# Patient Record
Sex: MALE | Race: WHITE | Employment: OTHER | URBAN - METROPOLITAN AREA
[De-identification: names, ages, dates, MRNs, and addresses within clinical notes are randomized per-mention and may not be internally consistent; named-entity substitution may affect disease eponyms.]

---

## 2017-06-20 ENCOUNTER — APPOINTMENT (OUTPATIENT)
Dept: GENERAL RADIOLOGY | Age: 72
End: 2017-06-20
Attending: INTERNAL MEDICINE
Payer: MEDICARE

## 2017-06-20 ENCOUNTER — HOSPITAL ENCOUNTER (OUTPATIENT)
Age: 72
Setting detail: OUTPATIENT SURGERY
Discharge: HOME | End: 2017-06-20
Attending: INTERNAL MEDICINE | Admitting: INTERNAL MEDICINE
Payer: MEDICARE

## 2017-06-20 ENCOUNTER — ANESTHESIA EVENT (OUTPATIENT)
Dept: ENDOSCOPY | Age: 72
End: 2017-06-20
Payer: MEDICARE

## 2017-06-20 ENCOUNTER — ANESTHESIA (OUTPATIENT)
Dept: ENDOSCOPY | Age: 72
End: 2017-06-20
Payer: MEDICARE

## 2017-06-20 VITALS
BODY MASS INDEX: 19.48 KG/M2 | HEIGHT: 76 IN | WEIGHT: 160 LBS | OXYGEN SATURATION: 98 % | RESPIRATION RATE: 18 BRPM | SYSTOLIC BLOOD PRESSURE: 165 MMHG | TEMPERATURE: 98 F | DIASTOLIC BLOOD PRESSURE: 81 MMHG | HEART RATE: 58 BPM

## 2017-06-20 PROCEDURE — 77030012595 HC SPHNTOM BILI BSC -D: Performed by: INTERNAL MEDICINE

## 2017-06-20 PROCEDURE — 77030036655 HC CATH ENDOSC ACC DEL SPYSCP BSC -H1: Performed by: INTERNAL MEDICINE

## 2017-06-20 PROCEDURE — 76040000029: Performed by: INTERNAL MEDICINE

## 2017-06-20 PROCEDURE — 77030032490 HC SLV COMPR SCD KNE COVD -B: Performed by: INTERNAL MEDICINE

## 2017-06-20 PROCEDURE — 76060000035 HC ANESTHESIA 2 TO 2.5 HR: Performed by: INTERNAL MEDICINE

## 2017-06-20 PROCEDURE — 74011250636 HC RX REV CODE- 250/636: Performed by: INTERNAL MEDICINE

## 2017-06-20 PROCEDURE — C1769 GUIDE WIRE: HCPCS | Performed by: INTERNAL MEDICINE

## 2017-06-20 PROCEDURE — C1726 CATH, BAL DIL, NON-VASCULAR: HCPCS | Performed by: INTERNAL MEDICINE

## 2017-06-20 PROCEDURE — 77030007288 HC DEV LOK BILI BSC -A: Performed by: INTERNAL MEDICINE

## 2017-06-20 PROCEDURE — 74011250636 HC RX REV CODE- 250/636: Performed by: ANESTHESIOLOGY

## 2017-06-20 PROCEDURE — 88112 CYTOPATH CELL ENHANCE TECH: CPT | Performed by: INTERNAL MEDICINE

## 2017-06-20 PROCEDURE — 74011250636 HC RX REV CODE- 250/636

## 2017-06-20 PROCEDURE — 77030011640 HC PAD GRND REM COVD -A: Performed by: INTERNAL MEDICINE

## 2017-06-20 PROCEDURE — 77030009038 HC CATH BILI STN RTVR BSC -C: Performed by: INTERNAL MEDICINE

## 2017-06-20 PROCEDURE — 77030008703 HC TU ET UNCUF COVD -A: Performed by: ANESTHESIOLOGY

## 2017-06-20 PROCEDURE — 74011000250 HC RX REV CODE- 250

## 2017-06-20 PROCEDURE — 74330 X-RAY BILE/PANC ENDOSCOPY: CPT

## 2017-06-20 PROCEDURE — C2625 STENT, NON-COR, TEM W/DEL SY: HCPCS | Performed by: INTERNAL MEDICINE

## 2017-06-20 PROCEDURE — 77030036933 HC BRSH RX CYTO WREGD BSC -C: Performed by: INTERNAL MEDICINE

## 2017-06-20 DEVICE — BILIARY STENT WITH RX DELIVERY SYSTEM
Type: IMPLANTABLE DEVICE | Site: BILE DUCT | Status: FUNCTIONAL
Brand: RX BILIARY

## 2017-06-20 RX ORDER — SODIUM CHLORIDE, SODIUM LACTATE, POTASSIUM CHLORIDE, CALCIUM CHLORIDE 600; 310; 30; 20 MG/100ML; MG/100ML; MG/100ML; MG/100ML
1000 INJECTION, SOLUTION INTRAVENOUS CONTINUOUS
Status: DISCONTINUED | OUTPATIENT
Start: 2017-06-20 | End: 2017-06-20 | Stop reason: HOSPADM

## 2017-06-20 RX ORDER — DEXAMETHASONE SODIUM PHOSPHATE 4 MG/ML
INJECTION, SOLUTION INTRA-ARTICULAR; INTRALESIONAL; INTRAMUSCULAR; INTRAVENOUS; SOFT TISSUE AS NEEDED
Status: DISCONTINUED | OUTPATIENT
Start: 2017-06-20 | End: 2017-06-20 | Stop reason: HOSPADM

## 2017-06-20 RX ORDER — ROCURONIUM BROMIDE 10 MG/ML
INJECTION, SOLUTION INTRAVENOUS AS NEEDED
Status: DISCONTINUED | OUTPATIENT
Start: 2017-06-20 | End: 2017-06-20 | Stop reason: HOSPADM

## 2017-06-20 RX ORDER — SUCCINYLCHOLINE CHLORIDE 20 MG/ML
INJECTION INTRAMUSCULAR; INTRAVENOUS AS NEEDED
Status: DISCONTINUED | OUTPATIENT
Start: 2017-06-20 | End: 2017-06-20 | Stop reason: HOSPADM

## 2017-06-20 RX ORDER — SODIUM CHLORIDE 0.9 % (FLUSH) 0.9 %
5-10 SYRINGE (ML) INJECTION AS NEEDED
Status: DISCONTINUED | OUTPATIENT
Start: 2017-06-20 | End: 2017-06-20 | Stop reason: HOSPADM

## 2017-06-20 RX ORDER — SODIUM CHLORIDE, SODIUM LACTATE, POTASSIUM CHLORIDE, CALCIUM CHLORIDE 600; 310; 30; 20 MG/100ML; MG/100ML; MG/100ML; MG/100ML
100 INJECTION, SOLUTION INTRAVENOUS CONTINUOUS
Status: DISCONTINUED | OUTPATIENT
Start: 2017-06-20 | End: 2017-06-20 | Stop reason: HOSPADM

## 2017-06-20 RX ORDER — CIPROFLOXACIN 2 MG/ML
400 INJECTION, SOLUTION INTRAVENOUS
Status: COMPLETED | OUTPATIENT
Start: 2017-06-20 | End: 2017-06-20

## 2017-06-20 RX ORDER — FAMOTIDINE 20 MG/1
20 TABLET, FILM COATED ORAL AS NEEDED
Status: DISCONTINUED | OUTPATIENT
Start: 2017-06-20 | End: 2017-06-20 | Stop reason: HOSPADM

## 2017-06-20 RX ORDER — LIDOCAINE HYDROCHLORIDE 20 MG/ML
INJECTION, SOLUTION EPIDURAL; INFILTRATION; INTRACAUDAL; PERINEURAL AS NEEDED
Status: DISCONTINUED | OUTPATIENT
Start: 2017-06-20 | End: 2017-06-20 | Stop reason: HOSPADM

## 2017-06-20 RX ORDER — FAMOTIDINE 10 MG/ML
INJECTION INTRAVENOUS
Status: COMPLETED
Start: 2017-06-20 | End: 2017-06-20

## 2017-06-20 RX ORDER — ONDANSETRON 2 MG/ML
INJECTION INTRAMUSCULAR; INTRAVENOUS AS NEEDED
Status: DISCONTINUED | OUTPATIENT
Start: 2017-06-20 | End: 2017-06-20 | Stop reason: HOSPADM

## 2017-06-20 RX ORDER — GLUCOSAMINE HCL 500 MG
TABLET ORAL
COMMUNITY

## 2017-06-20 RX ORDER — FENTANYL CITRATE 50 UG/ML
INJECTION, SOLUTION INTRAMUSCULAR; INTRAVENOUS AS NEEDED
Status: DISCONTINUED | OUTPATIENT
Start: 2017-06-20 | End: 2017-06-20 | Stop reason: HOSPADM

## 2017-06-20 RX ORDER — PROPOFOL 10 MG/ML
INJECTION, EMULSION INTRAVENOUS AS NEEDED
Status: DISCONTINUED | OUTPATIENT
Start: 2017-06-20 | End: 2017-06-20 | Stop reason: HOSPADM

## 2017-06-20 RX ADMIN — SODIUM CHLORIDE, SODIUM LACTATE, POTASSIUM CHLORIDE, AND CALCIUM CHLORIDE: 600; 310; 30; 20 INJECTION, SOLUTION INTRAVENOUS at 11:48

## 2017-06-20 RX ADMIN — DEXAMETHASONE SODIUM PHOSPHATE 4 MG: 4 INJECTION, SOLUTION INTRA-ARTICULAR; INTRALESIONAL; INTRAMUSCULAR; INTRAVENOUS; SOFT TISSUE at 11:06

## 2017-06-20 RX ADMIN — CIPROFLOXACIN 400 MG: 2 INJECTION, SOLUTION INTRAVENOUS at 11:54

## 2017-06-20 RX ADMIN — PROPOFOL 30 MG: 10 INJECTION, EMULSION INTRAVENOUS at 11:45

## 2017-06-20 RX ADMIN — LIDOCAINE HYDROCHLORIDE 60 MG: 20 INJECTION, SOLUTION EPIDURAL; INFILTRATION; INTRACAUDAL; PERINEURAL at 11:00

## 2017-06-20 RX ADMIN — FENTANYL CITRATE 50 MCG: 50 INJECTION, SOLUTION INTRAMUSCULAR; INTRAVENOUS at 11:00

## 2017-06-20 RX ADMIN — FENTANYL CITRATE 25 MCG: 50 INJECTION, SOLUTION INTRAMUSCULAR; INTRAVENOUS at 12:15

## 2017-06-20 RX ADMIN — ONDANSETRON 4 MG: 2 INJECTION INTRAMUSCULAR; INTRAVENOUS at 12:08

## 2017-06-20 RX ADMIN — ROCURONIUM BROMIDE 5 MG: 10 INJECTION, SOLUTION INTRAVENOUS at 11:00

## 2017-06-20 RX ADMIN — FAMOTIDINE: 10 INJECTION, SOLUTION INTRAVENOUS at 10:40

## 2017-06-20 RX ADMIN — SODIUM CHLORIDE, SODIUM LACTATE, POTASSIUM CHLORIDE, AND CALCIUM CHLORIDE 1000 ML: 600; 310; 30; 20 INJECTION, SOLUTION INTRAVENOUS at 10:33

## 2017-06-20 RX ADMIN — SUCCINYLCHOLINE CHLORIDE 140 MG: 20 INJECTION INTRAMUSCULAR; INTRAVENOUS at 11:00

## 2017-06-20 RX ADMIN — LIDOCAINE HYDROCHLORIDE 40 MG: 20 INJECTION, SOLUTION EPIDURAL; INFILTRATION; INTRACAUDAL; PERINEURAL at 11:03

## 2017-06-20 RX ADMIN — FENTANYL CITRATE 25 MCG: 50 INJECTION, SOLUTION INTRAMUSCULAR; INTRAVENOUS at 11:45

## 2017-06-20 RX ADMIN — PROPOFOL 170 MG: 10 INJECTION, EMULSION INTRAVENOUS at 11:00

## 2017-06-20 NOTE — H&P
>      Patient:  Kiya Cabral  YOB: 1945   Date:                       06/19/2017 4:43 PM   Visit Type:                 Office Visit        This 66year old male presents for RUQ Pain, Jaundice and CBD dilatation. History of Present Illness:  1.  RUQ Pain   2. Jaundice   3. CBD dilatation   Work-In MD: Dr. Jonh Garcia    This is a 18-year-old male who is seen today for right upper quadrant pain, dilated common bile duct, elevated LFTs, and jaundice. He had a complete physical done in May 2017 with labs which revealed normal LFTs at that time. He was treated with Bactrim starting 5/25/17 for 10 days for an infected fingernail. Some time after that treatment with Bactrim he started to become jaundiced. He also started experiencing some right upper quadrant pain. Denies any nausea/vomiting. He also had some new onset constipation. He took some Dulocolax and MOM for the constipation. He presented to his PCP 6/15/17 for complaints of constipation and was noted to be jaundiced. RUQ u/s and labs were performed. TB is 23, , , , Viral hepatitis panel is negative. U/S revealed a CBD 14mm along with intrahepatic ductal dilatation. Sludge was noted in the gallbladder. Pancrease appeared normal on U/S. He reports having mild RUQ pain wich is intermittent. Denies any fever/chills. ROS: A 10 point review of systems was completed and is positive per HPI otherwise negative      PAST MEDICAL/SURGICAL HISTORY   (Detailed)    Disease/disorder Onset Date Management Date Comments   migraines, crushed tibial plateu skiing, rotator cuff tears both sides repaired, colon polyp at age 61 in [place], Squamous cell CA removed L temple, Left collar bone fracture after a fall, rhinoplasty 1/2015.      Cardiac stress test 2012: good    TNM 06/19/2017 -   Additional Medical History  Denies any major medical history          Family History  (Detailed)      Social History: (Detailed)  Preferred language is [place]. MARITAL STATUS/FAMILY/SOCIAL SUPPORT  Currently unknown. PHYSICAL EXAM  GENERAL: JAUNDICED, no acute distress  HEENT: Head is normocephalic, atraumatic, sclerae Icterus. PULMONARY: Comfortable breathing with no accessory muscle use, CTA bilaterally, no wheezes/rhonchi   CARDIOVASCULAR: RRR, no murmurs, gallops. or rubs appreciated  ABDOMEN: soft, Nondistended, TTP in RUQ, normoactive bowel sounds, no palpable masses appreciated, no obvious hepatomegaly or splenomegaly  RECTAL: deferred  EXTREMITY: no edema or cyanosis noted  SKIN: warm, dry, intact, no rashes noted. NEUROLOGICAL: Alert and oriented, gross memory appears intact  PSYCHIATRIC: cooperative, mood and affect appropriate    Assessment/Plan  # Detail Type Description    1. Assessment Jaundice (R17). 2. Assessment elevated LFTs (R79.89). 3. Assessment Right upper quadrant pain (R10.11). Provider Plan This is a 67 yo male who is seen today for complaints of Jaundice, RUQ Pain, dilated CBD on U/S, Elevated LFT. He did finish 10 day course of Bactrim which started May 25 so DILI is in the differential. However, with elevated LFT, jaundice, RUQ Pain and findings on U/S will plan for ERCP to evaluate for possible choledocholithiasis, extrinsic compression, biliary sludge, etc. He will call with any increasing pain, fever, or chills. ASA PS III   Risk of procedure is discussed with patient to include risk of infection, bleeding, perforation, sedation reaction, Pancreatitis, and death. He voiced understanding and wishes to plan for procedure. This patient was seen in collaboration with DR. Asuncion Espino who agrees with this assessment and plan. Elements of this note may have been dictated using speech recognition software. As a result, errors of speech recognition may have occurred.       Provider:      Duc Diaz 06/19/2017 5:54 PM   Document generated by: Silvia Jacob 06/19/2017    Electronically signed by Silvia Jacob NP on 06/19/2017 05:54 PM

## 2017-06-20 NOTE — DISCHARGE INSTRUCTIONS
Endoscopic Retrograde Cholangiopancreatogram (ERCP): What to Expect at [address]  After you have an endoscopic retrograde cholangiopancreatogram (ERCP), you probably will stay at the hospital or clinic for 1 to 2 hours. This will allow the medicine to wear off. You will be able to go home after your doctor or a nurse checks to make sure you are not having any problems. If you stay in the hospital overnight, you may go home the next day. You may have a sore throat for a day or two after the procedure. This care sheet gives you a general idea about how long it will take for you to recover. But each person recovers at a different pace. Follow the steps below to get better as quickly as possible. How can you care for yourself at home? Activity  · Rest as much as you need to after you go home. · You should be able to go back to your usual activities the day after the procedure. Diet  · Follow your doctor's directions for eating after the procedure. · Drink plenty of fluids (unless your doctor tells you not to). Medicines  · Your doctor will tell you if and when you can restart your medicines. He or she will also give you instructions about taking any new medicines. · If you take blood thinners, such as warfarin (Coumadin), clopidogrel (Plavix), or aspirin, be sure to talk to your doctor. He or she will tell you if and when to start taking those medicines again. Make sure that you understand exactly what your doctor wants you to do. · If you have a sore throat the next day, use an over-the-counter spray to numb your throat. Be safe with medicines. Read and follow all instructions on the label. Follow-up care is a key part of your treatment and safety. Be sure to make and go to all appointments, and call your doctor if you are having problems. It's also a good idea to know your test results and keep a list of the medicines you take. When should you call for help?   Call 911 anytime you think you may need emergency care. For example, call if:  · You vomit blood or what looks like coffee grounds. · You pass maroon or bloody stools. Call your doctor now or go to the emergency room if:  · You have trouble swallowing. · You have belly pain. · Your stools are black and tarlike or have streaks of blood. · You are sick to your stomach and cannot drink fluids. · You have a fever. · You have pain that does not get better after you take your pain medicine. Watch closely for changes in your health, and be sure to contact your doctor if:  · Your throat still hurts after a day or two. · You do not get better as expected. Where can you learn more? Go to http://abdiel-ezekiel.info/. Enter C286 in the search box to learn more about \"Endoscopic Retrograde Cholangiopancreatogram (ERCP): What to Expect at Home. \"  Current as of: August 9, 2016  Content Version: 11.3  © 6100-7156 [employer]. Care instructions adapted under license by [employer] (which disclaims liability or warranty for this information). If you have questions about a medical condition or this instruction, always ask your healthcare professional. [address] any warranty or liability for your use of this information. After general anesthesia or intravenous sedation, for 24 hours or while taking prescription Narcotics:  · Limit your activities  · Do not drive and operate hazardous machinery  · Do not make important personal or business decisions  · Do  not drink alcoholic beverages  · If you have not urinated within 8 hours after discharge, please contact your surgeon on call. *  Please give a list of your current medications to your Primary Care Provider. *  Please update this list whenever your medications are discontinued, doses are      changed, or new medications (including over-the-counter products) are added.   *  Please carry medication information at all times in case of emergency situations. These are general instructions for a healthy lifestyle:  No smoking/ No tobacco products/ Avoid exposure to second hand smoke  Surgeon General's Warning:  Quitting smoking now greatly reduces serious risk to your health. Obesity, smoking, and sedentary lifestyle greatly increases your risk for illness  A healthy diet, regular physical exercise & weight monitoring are important for maintaining a healthy lifestyle    You may be retaining fluid if you have a history of heart failure or if you experience any of the following symptoms:  Weight gain of 3 pounds or more overnight or 5 pounds in a week, increased swelling in our hands or feet or shortness of breath while lying flat in bed. Please call your doctor as soon as you notice any of these symptoms; do not wait until your next office visit. Recognize signs and symptoms of STROKE:    F-face looks uneven    A-arms unable to move or move unevenly    S-speech slurred or non-existent    T-time-call 911 as soon as signs and symptoms begin-DO NOT go       Back to bed or wait to see if you get better-TIME IS BRAIN.

## 2017-06-20 NOTE — PROCEDURES
ENDOSCOPIC  RETROGRADE CHOLANGIOPANCREATOGRAPHY    DATE of PROCEDURE: 6/20/2017    PT NAME: Funmilayo Obando     [id number]    MEDICATION: general;     INSTRUMENT:  [id number] VF    SPECIAL PROCEDURE:papillotomy w/ balloon sweep; spyglass, 40 x 10 hurricane balloon; dual 8.5 x 12 cm biliary stents; cytology  BLOOD LOSS- 0 to min. SPEC-   IMPLANT- none    PROCEDURE: standard procedure w/o complications    ASSESSMENT:  1. Tight stricture just above cystic duct takeoff suspicious for malignancy- initially contrast or wire would not pass. Using spyglass we passed a guide wire. Using an rx 44 we were able to \"saw\" an opening enough for hurricane balloon. 10 mm hurricane balloon was able to obliterate the tight waist. Cytology was obtained. 2 biliary stents were placed but neither followed the guide wire into the left system. Both well above the stricture.    2. Pancreas not evaluated    3. GB normal    PLAN:  1. F/u per Dr. Brendan Worthy MD

## 2017-06-20 NOTE — ANESTHESIA PREPROCEDURE EVALUATION
Anesthetic History   No history of anesthetic complications            Review of Systems / Medical History  Patient summary reviewed, nursing notes reviewed and pertinent labs reviewed    Pulmonary  Within defined limits                 Neuro/Psych   Within defined limits           Cardiovascular  Within defined limits                Exercise tolerance: >4 METS     GI/Hepatic/Renal     GERD: well controlled          Comments: Patient has obstructive jaundice Endo/Other  Within defined limits           Other Findings              Physical Exam    Airway  Mallampati: II  TM Distance: 4 - 6 cm  Neck ROM: normal range of motion   Mouth opening: Normal     Cardiovascular  Regular rate and rhythm,  S1 and S2 normal,  no murmur, click, rub, or gallop  Rhythm: regular  Rate: normal         Dental    Dentition: Caps/crowns     Pulmonary  Breath sounds clear to auscultation               Abdominal         Other Findings            Anesthetic Plan    ASA: 2  Anesthesia type: general          Induction: Intravenous  Anesthetic plan and risks discussed with: Patient

## 2017-06-20 NOTE — IP AVS SNAPSHOT
[address] [address] 
[phone] Patient: Sheryle Grant MRN: [id number] OLF:5/61/5779 You are allergic to the following No active allergies Recent Documentation Height Weight BMI Smoking Status 1.93 m 72.6 kg 19.48 kg/m2 Never Smoker Emergency Contacts Name Discharge Info Relation Home Work Mobile Amilcar Sommer  Friend [5] [phone] About your hospitalization You were admitted on:  June 20, 2017 You last received care in the:  [facility] PACU You were discharged on:  June 20, 2017 Unit phone number:  [phone] Why you were hospitalized Your primary diagnosis was:  Not on File Providers Seen During Your Hospitalizations Provider Role Specialty Primary office phone Chantel Patel MD Attending Provider Gastroenterology [phone] Your Primary Care Physician (PCP) Primary Care Physician Office Phone Office Fax [place], [address] [place] [address] [phone] Follow-up Information Follow up With Details Comments Contact Info Chantel Patel MD  Follow up as Dr. Consuelo Franks directs [address] [facility] [address] [facility] [address] 
[phone] Current Discharge Medication List  
  
ASK your doctor about these medications Dose & Instructions Dispensing Information Comments Morning Noon Evening Bedtime Cholecalciferol (Vitamin D3) 3,000 unit Tab Your last dose was: Your next dose is: Take  by mouth. Refills:  0 DIGESTIVE ENZYMES PO Your last dose was: Your next dose is: Take  by mouth. Refills:  0 OMEGA 3 PO Your last dose was: Your next dose is: Take  by mouth. Refills:  0 PROBIOTIC 4X 10-15 mg Tbec Generic drug:  B.infantis-B.ani-B.long-B.bifi Your last dose was: Your next dose is: Take  by mouth. Refills:  0  
     
   
   
   
  
 VITAMIN B12 PO Your last dose was: Your next dose is: Take  by mouth. Refills:  0 Discharge Instructions Endoscopic Retrograde Cholangiopancreatogram (ERCP): What to Expect at [facility] Your Recovery After you have an endoscopic retrograde cholangiopancreatogram (ERCP), you probably will stay at the hospital or clinic for 1 to 2 hours. This will allow the medicine to wear off. You will be able to go home after your doctor or a nurse checks to make sure you are not having any problems. If you stay in the hospital overnight, you may go home the next day. You may have a sore throat for a day or two after the procedure. This care sheet gives you a general idea about how long it will take for you to recover. But each person recovers at a different pace. Follow the steps below to get better as quickly as possible. How can you care for yourself at home? Activity · Rest as much as you need to after you go home. · You should be able to go back to your usual activities the day after the procedure. Diet · Follow your doctor's directions for eating after the procedure. · Drink plenty of fluids (unless your doctor tells you not to). Medicines · Your doctor will tell you if and when you can restart your medicines. He or she will also give you instructions about taking any new medicines. · If you take blood thinners, such as warfarin (Coumadin), clopidogrel (Plavix), or aspirin, be sure to talk to your doctor. He or she will tell you if and when to start taking those medicines again. Make sure that you understand exactly what your doctor wants you to do.  
· If you have a sore throat the next day, use an over-the-counter spray to numb your throat. Be safe with medicines. Read and follow all instructions on the label. Follow-up care is a key part of your treatment and safety. Be sure to make and go to all appointments, and call your doctor if you are having problems. It's also a good idea to know your test results and keep a list of the medicines you take. When should you call for help? Call 911 anytime you think you may need emergency care. For example, call if: 
· You vomit blood or what looks like coffee grounds. · You pass maroon or bloody stools. Call your doctor now or go to the emergency room if: 
· You have trouble swallowing. · You have belly pain. · Your stools are black and tarlike or have streaks of blood. · You are sick to your stomach and cannot drink fluids. · You have a fever. · You have pain that does not get better after you take your pain medicine. Watch closely for changes in your health, and be sure to contact your doctor if: 
· Your throat still hurts after a day or two. · You do not get better as expected. Where can you learn more? Go to http://abdiel-ezekiel.info/. Enter W032 in the search box to learn more about \"Endoscopic Retrograde Cholangiopancreatogram (ERCP): What to Expect at Home. \" Current as of: August 9, 2016 Content Version: 11.3 © 5840-0475 [employer]. Care instructions adapted under license by [employer] (which disclaims liability or warranty for this information). If you have questions about a medical condition or this instruction, always ask your healthcare professional. [address] [address] any warranty or liability for your use of this information. After general anesthesia or intravenous sedation, for 24 hours or while taking prescription Narcotics: · Limit your activities · Do not drive and operate hazardous machinery · Do not make important personal or business decisions · Do  not drink alcoholic beverages · If you have not urinated within 8 hours after discharge, please contact your surgeon on call. *  Please give a list of your current medications to your Primary Care Provider. *  Please update this list whenever your medications are discontinued, doses are 
    changed, or new medications (including over-the-counter products) are added. *  Please carry medication information at all times in case of emergency situations. These are general instructions for a healthy lifestyle: No smoking/ No tobacco products/ Avoid exposure to second hand smoke Surgeon General's Warning:  Quitting smoking now greatly reduces serious risk to your health. Obesity, smoking, and sedentary lifestyle greatly increases your risk for illness A healthy diet, regular physical exercise & weight monitoring are important for maintaining a healthy lifestyle You may be retaining fluid if you have a history of heart failure or if you experience any of the following symptoms:  Weight gain of 3 pounds or more overnight or 5 pounds in a week, increased swelling in our hands or feet or shortness of breath while lying flat in bed. Please call your doctor as soon as you notice any of these symptoms; do not wait until your next office visit. Recognize signs and symptoms of STROKE: 
 
F-face looks uneven A-arms unable to move or move unevenly S-speech slurred or non-existent T-time-call 911 as soon as signs and symptoms begin-DO NOT go Back to bed or wait to see if you get better-TIME IS BRAIN. Discharge Orders None Introducing [facility]! Zuri Call introduces [employer] patient portal. Now you can access parts of your medical record, email your doctor's office, and request medication refills online. 1. In your internet browser, go to [email or web address] 2. Click on the First Time User? Click Here link in the Sign In box. You will see the New Member Sign Up page. 3. Enter your Fancloud Access Code exactly as it appears below. You will not need to use this code after youve completed the sign-up process. If you do not sign up before the expiration date, you must request a new code. · Fan[employer] Access Code: [email or web address] Expires: 9/18/2017  9:14 AM 
 
4. Enter the last four digits of your Social Security Number (xxxx) and Date of Birth (mm/dd/yyyy) as indicated and click Submit. You will be taken to the next sign-up page. 5. Create a Fancloud ID. This will be your Fancloud login ID and cannot be changed, so think of one that is secure and easy to remember. 6. Create a Fan[employer] password. You can change your password at any time. 7. Enter your Password Reset Question and Answer. This can be used at a later time if you forget your password. 8. Enter your e-mail address. You will receive e-mail notification when new information is available in [address]. 9. Click Sign Up. You can now view and download portions of your medical record. 10. Click the Download Summary menu link to download a portable copy of your medical information. If you have questions, please visit the Frequently Asked Questions section of the Fan[employer] website. Remember, Fan[employer] is NOT to be used for urgent needs. For medical emergencies, dial 911. Now available from your iPhone and Android! General Information Please provide this summary of care documentation to your next provider. Patient Signature:  ____________________________________________________________ Date:  ____________________________________________________________  
  
[facility] Provider Signature:  ____________________________________________________________ Date:  ____________________________________________________________

## 2017-06-20 NOTE — INTERVAL H&P NOTE
H&P Update:  Shelby Villela was seen and examined. History and physical has been reviewed. The patient has been examined. There have been no significant clinical changes since the completion of the originally dated History and Physical.  Patient identified by surgeon; surgical site was confirmed by patient and surgeon.     Signed By: Terrence Maldonado MD     June 20, 2017 10:51 AM

## 2017-06-20 NOTE — ANESTHESIA POSTPROCEDURE EVALUATION
Post-Anesthesia Evaluation and Assessment    Patient: Devon Mayorga MRN: [id number]  SSN: [id number]    YOB: 1945  Age: 66 y.o. Sex: male       Cardiovascular Function/Vital Signs  Visit Vitals    /83    Pulse (!) 58    Temp 36.1 °C (97 °F)    Resp 25    Ht 6' 4\" (1.93 m)    Wt 72.6 kg (160 lb)    SpO2 99%    BMI 19.48 kg/m2       Patient is status post general anesthesia for Procedure(s):  ENDOSCOPIC RETROGRADE CHOLANGIOPANCREATOGRAPHY  ENDOSCOPIC SPHINCTEROTOMY  ENDOSCOPIC STONE EXTRACTION/BALLOON SWEEP  ENDOSCOPIC RETROGRADE CHOLANGIOPANCREATOGRAPHY DIRECT VISUALIZATION  BILIARY DILATATION  ENDOSCOPIC BRUSHING  BILIARY STENT PLACEMENT. Nausea/Vomiting: None    Postoperative hydration reviewed and adequate. Pain:  Pain Scale 1: Numeric (0 - 10) (06/20/17 1306)  Pain Intensity 1: 0 (06/20/17 1306)   Managed    Neurological Status:   Neuro (WDL): Exceptions to WDL (06/20/17 1306)  Neuro  Neurologic State: Drowsy (06/20/17 1306)  LUE Motor Response: Purposeful (06/20/17 1306)  LLE Motor Response: Purposeful (06/20/17 1306)  RUE Motor Response: Purposeful (06/20/17 1306)  RLE Motor Response: Purposeful (06/20/17 1306)   At baseline    Mental Status and Level of Consciousness: Arousable    Pulmonary Status:   O2 Device: Nasal cannula (06/20/17 1306)   Adequate oxygenation and airway patent    Complications related to anesthesia: None    Post-anesthesia assessment completed.  No concerns    Signed By: Maris Warren MD     June 20, 2017

## 2017-07-03 ENCOUNTER — HOSPITAL ENCOUNTER (OUTPATIENT)
Dept: MRI IMAGING | Age: 72
Discharge: HOME | End: 2017-07-03
Attending: INTERNAL MEDICINE
Payer: MEDICARE

## 2017-07-03 DIAGNOSIS — R79.89 ABNORMAL LFTS: ICD-10-CM

## 2017-07-03 DIAGNOSIS — R17 JAUNDICE: ICD-10-CM

## 2017-07-03 DIAGNOSIS — K83.1 OBSTRUCTION OF BILE DUCT: ICD-10-CM

## 2017-07-03 DIAGNOSIS — K83.8 COMMON BILE DUCT DILATATION: ICD-10-CM

## 2017-07-03 LAB — CREAT BLD-MCNC: 0.9 MG/DL (ref 0.6–1)

## 2017-07-03 PROCEDURE — A9577 INJ MULTIHANCE: HCPCS | Performed by: INTERNAL MEDICINE

## 2017-07-03 PROCEDURE — 74183 MRI ABD W/O CNTR FLWD CNTR: CPT

## 2017-07-03 PROCEDURE — 74011250636 HC RX REV CODE- 250/636: Performed by: INTERNAL MEDICINE

## 2017-07-03 PROCEDURE — 82565 ASSAY OF CREATININE: CPT

## 2017-07-03 PROCEDURE — 74011000258 HC RX REV CODE- 258: Performed by: INTERNAL MEDICINE

## 2017-07-03 RX ORDER — SODIUM CHLORIDE 0.9 % (FLUSH) 0.9 %
10 SYRINGE (ML) INJECTION
Status: COMPLETED | OUTPATIENT
Start: 2017-07-03 | End: 2017-07-03

## 2017-07-03 RX ADMIN — GADOBENATE DIMEGLUMINE 10 ML: 529 INJECTION, SOLUTION INTRAVENOUS at 16:38

## 2017-07-03 RX ADMIN — Medication 10 ML: at 16:38

## 2017-07-03 RX ADMIN — SODIUM CHLORIDE 100 ML: 900 INJECTION, SOLUTION INTRAVENOUS at 16:38

## 2018-10-22 ENCOUNTER — HOSPITAL ENCOUNTER (OUTPATIENT)
Dept: LAB | Age: 73
Discharge: HOME | End: 2018-10-22

## 2018-10-22 LAB
ALBUMIN SERPL-MCNC: 2 G/DL (ref 3.2–4.6)
ALBUMIN/GLOB SERPL: 0.5 {RATIO} (ref 1.2–3.5)
ALP SERPL-CCNC: 442 U/L (ref 50–136)
ALT SERPL-CCNC: 58 U/L (ref 12–65)
ANION GAP SERPL CALC-SCNC: 10 MMOL/L (ref 7–16)
AST SERPL-CCNC: 89 U/L (ref 15–37)
BASOPHILS # BLD: 0.1 K/UL (ref 0–0.2)
BASOPHILS NFR BLD: 1 % (ref 0–2)
BILIRUB DIRECT SERPL-MCNC: 13.2 MG/DL
BILIRUB SERPL-MCNC: 15.4 MG/DL (ref 0.2–1.1)
BUN SERPL-MCNC: 15 MG/DL (ref 8–23)
CALCIUM SERPL-MCNC: 8.6 MG/DL (ref 8.3–10.4)
CHLORIDE SERPL-SCNC: 103 MMOL/L (ref 98–107)
CO2 SERPL-SCNC: 26 MMOL/L (ref 21–32)
CREAT SERPL-MCNC: 0.64 MG/DL (ref 0.8–1.5)
D DIMER PPP FEU-MCNC: 2.11 UG/ML(FEU)
DIFFERENTIAL METHOD BLD: ABNORMAL
EOSINOPHIL # BLD: 0.2 K/UL (ref 0–0.8)
EOSINOPHIL NFR BLD: 3 % (ref 0.5–7.8)
ERYTHROCYTE [DISTWIDTH] IN BLOOD BY AUTOMATED COUNT: 23.1 %
GLOBULIN SER CALC-MCNC: 4.4 G/DL (ref 2.3–3.5)
GLUCOSE SERPL-MCNC: 75 MG/DL (ref 65–100)
HCT VFR BLD AUTO: 39.7 % (ref 41.1–50.3)
HGB BLD-MCNC: 12.7 G/DL (ref 13.6–17.2)
IMM GRANULOCYTES # BLD: 0.1 K/UL (ref 0–0.5)
IMM GRANULOCYTES NFR BLD AUTO: 1 % (ref 0–5)
LYMPHOCYTES # BLD: 0.9 K/UL (ref 0.5–4.6)
LYMPHOCYTES NFR BLD: 10 % (ref 13–44)
MCH RBC QN AUTO: 26.9 PG (ref 26.1–32.9)
MCHC RBC AUTO-ENTMCNC: 32 G/DL (ref 31.4–35)
MCV RBC AUTO: 84.1 FL (ref 79.6–97.8)
MONOCYTES # BLD: 0.7 K/UL (ref 0.1–1.3)
MONOCYTES NFR BLD: 8 % (ref 4–12)
NEUTS SEG # BLD: 7.3 K/UL (ref 1.7–8.2)
NEUTS SEG NFR BLD: 79 % (ref 43–78)
NRBC # BLD: 0 K/UL (ref 0–0.2)
PLATELET # BLD AUTO: 398 K/UL (ref 150–450)
PMV BLD AUTO: 10.4 FL (ref 9.4–12.3)
POTASSIUM SERPL-SCNC: 4.4 MMOL/L (ref 3.5–5.1)
PROT SERPL-MCNC: 6.4 G/DL (ref 6.3–8.2)
RBC # BLD AUTO: 4.72 M/UL (ref 4.23–5.6)
SODIUM SERPL-SCNC: 139 MMOL/L (ref 136–145)
WBC # BLD AUTO: 9.2 K/UL (ref 4.3–11.1)

## 2018-10-22 PROCEDURE — 80053 COMPREHEN METABOLIC PANEL: CPT

## 2018-10-22 PROCEDURE — 85379 FIBRIN DEGRADATION QUANT: CPT

## 2018-10-22 PROCEDURE — 82248 BILIRUBIN DIRECT: CPT

## 2018-10-22 PROCEDURE — 85025 COMPLETE CBC W/AUTO DIFF WBC: CPT

## (undated) DEVICE — SYR 3ML LL TIP 1/10ML GRAD --

## (undated) DEVICE — DEVICE LCK BILI RAP EXCHG OLPS --

## (undated) DEVICE — SYR 5ML 1/5 GRAD LL NSAF LF --

## (undated) DEVICE — BALLOON DILATATION CATHETER: Brand: HURRICANE™ RX

## (undated) DEVICE — GDWIRE BILI JAG STR 450CM --

## (undated) DEVICE — NDL PRT INJ NSAF BLNT 18GX1.5 --

## (undated) DEVICE — SPHINCTEROTOME: Brand: JAGTOME RX 39

## (undated) DEVICE — KENDALL RADIOLUCENT FOAM MONITORING ELECTRODE RECTANGULAR SHAPE: Brand: KENDALL

## (undated) DEVICE — CANNULA NSL ORAL AD FOR CAPNOFLEX CO2 O2 AIRLFE

## (undated) DEVICE — KENDALL SCD EXPRESS SLEEVES, KNEE LENGTH, MEDIUM: Brand: KENDALL SCD

## (undated) DEVICE — WIREGUIDED CYTOLOGY BRUSH: Brand: RX CYTOLOGY BRUSH

## (undated) DEVICE — ADULT SPO2 SENSOR: Brand: NELLCOR

## (undated) DEVICE — ACCESS AND DELIVERY CATHETER: Brand: SPYSCOPE™ DS II

## (undated) DEVICE — SPHINCTEROTOME: Brand: JAGTOME RX 44

## (undated) DEVICE — REM POLYHESIVE ADULT PATIENT RETURN ELECTRODE: Brand: VALLEYLAB

## (undated) DEVICE — RETRIEVAL BALLOON CATHETER: Brand: EXTRACTOR™ PRO RX

## (undated) DEVICE — (D)SYR 10ML 1/5ML GRAD NSAF -- PKGING CHANGE USE ITEM 338027

## (undated) DEVICE — BLOCK BITE AD 60FR W/ VELC STRP ADDRESSES MOST PT AND